# Patient Record
Sex: FEMALE | Race: WHITE | NOT HISPANIC OR LATINO | Employment: FULL TIME | URBAN - METROPOLITAN AREA
[De-identification: names, ages, dates, MRNs, and addresses within clinical notes are randomized per-mention and may not be internally consistent; named-entity substitution may affect disease eponyms.]

---

## 2017-11-19 ENCOUNTER — OFFICE VISIT (OUTPATIENT)
Dept: URGENT CARE | Facility: MEDICAL CENTER | Age: 62
End: 2017-11-19
Payer: COMMERCIAL

## 2017-11-19 PROCEDURE — 99202 OFFICE O/P NEW SF 15 MIN: CPT

## 2017-11-20 NOTE — PROGRESS NOTES
Assessment    1  Acute URI (465 9) (J06 9)   2  Sinus congestion (298 19) (R09 81)    Plan  Sinus congestion    · Amoxicillin 875 MG Oral Tablet; Take 1 tablet twice daily    Discussion/Summary  Discussion Summary:   Patient's symptoms are likely secondary to acute upper respiratory viral infection  Advised to use Aleve D or Tylenol Cold and sinus or Advil Cold and Sinus over-the-counter per label instructions for not more than 5-7 days  Warm salt water gargles advised  Steam inhalation advised  Plenty of fluids advised  symptoms not improving or worse in next 3-7 days, advised to follow-up with PCP for reevaluation  If any acute worsening of symptoms, advised to go to [address] [place] [address]      Medication Side Effects Reviewed: Possible side effects of new medications were reviewed with the patient/guardian today  Understands and agrees with treatment plan: The treatment plan was reviewed with the patient/guardian  The patient/guardian understands and agrees with the treatment plan      Chief Complaint    1  Cold Symptoms  Chief Complaint Free Text Note Form: Patient complains of cough, congestion and sinus pain for the past three days  History of Present Illness  HPI: Patient is a 64year old female with no PMH presenting with sinus pain and congestion for 3 days  Patient is also complaining of cough and sore throat  She states the symptoms have been getting progressively worse, and she had a fever of 100 F last night  She also reports chills during the night as well  Patient has found no relief with DayQuil or NyQuil OTC medications  Patient denies having fall seasonal allergies, post-nasal drip, nausea, vomiting, diarrhea, headache, ear pain  Hospital Based Practices Required Assessment:  Pain Assessment  the patient states they have pain  (on a scale of 0 to 10, the patient rates the pain at 6 )  Abuse And Domestic Violence Screen   Yes, the patient is safe at home  -- The patient states no one is hurting them  Depression And Suicide Screen  No, the patient has not had thoughts of hurting themself  No, the patient has not felt depressed in the past 7 days  Prefered Language is  english  Primary Language is  english  Cold Symptoms:  Associated symptoms include runny nose,-- post nasal drainage,-- dry cough-- and-- facial pain, but-- no fever-- and-- no chills  Review of Systems  Focused-Female:  Constitutional: fever,-- feeling poorly,-- chills-- and-- feeling tired  ENT: sore throat-- and-- nasal discharge, but-- no earache  Respiratory: cough  Gastrointestinal: no nausea,-- no vomiting-- and-- no diarrhea  Neurological: no headache  Past Medical History  1  History of hypothyroidism (V12 29) (Z86 39)    Social History     · Occasional alcohol use    Current Meds   1  Synthroid 100 MCG Oral Tablet; Therapy: (Recorded:[id number]) to Recorded    Allergies    1  No Known Drug Allergies    Vitals  Signs   Recorded: [id number] 11:14AM   Temperature: 98 6 F, Tympanic  Heart Rate: 82  Respiration: 18  Systolic: 288, Sitting  Diastolic: 56, Sitting  Height: 5 ft 3 in  Weight: 175 lb   BMI Calculated: 31  BSA Calculated: 1 83  O2 Saturation: 98, RA  Pain Scale: 6    Physical Exam   Constitutional  General appearance: No acute distress, well appearing and well nourished  Eyes  Conjunctiva and lids: No swelling, erythema or discharge  Pupils and irises: Equal, round and reactive to light  Ears, Nose, Mouth, and Throat  External inspection of ears and nose: Normal    Otoscopic examination: Tympanic membranes translucent with normal light reflex  Canals patent without erythema  Nasal mucosa, septum, and turbinates: Abnormal  -- Mild congestion  Oropharynx: Normal with no erythema, edema, exudate or lesions  Pulmonary  Respiratory effort: No increased work of breathing or signs of respiratory distress  Auscultation of lungs: Clear to auscultation     Cardiovascular  Auscultation of heart: Normal rate and rhythm, normal S1 and S2, without murmurs  Additional Exam:  Left maxillary sinus tenderness to palpation        Signatures   Electronically signed by : MACY Hermosillo ; Nov 19 2017  4:53PM EST                       (Author)

## 2021-04-08 DIAGNOSIS — Z23 ENCOUNTER FOR IMMUNIZATION: ICD-10-CM
